# Patient Record
Sex: MALE | Race: ASIAN | NOT HISPANIC OR LATINO
[De-identification: names, ages, dates, MRNs, and addresses within clinical notes are randomized per-mention and may not be internally consistent; named-entity substitution may affect disease eponyms.]

---

## 2019-05-07 ENCOUNTER — TRANSCRIPTION ENCOUNTER (OUTPATIENT)
Age: 34
End: 2019-05-07

## 2019-09-09 ENCOUNTER — TRANSCRIPTION ENCOUNTER (OUTPATIENT)
Age: 34
End: 2019-09-09

## 2020-05-05 ENCOUNTER — TRANSCRIPTION ENCOUNTER (OUTPATIENT)
Age: 35
End: 2020-05-05

## 2020-06-25 ENCOUNTER — OUTPATIENT (OUTPATIENT)
Dept: OUTPATIENT SERVICES | Facility: HOSPITAL | Age: 35
LOS: 1 days | End: 2020-06-25

## 2020-06-25 LAB — SARS-COV-2 RNA SPEC QL NAA+PROBE: SIGNIFICANT CHANGE UP

## 2021-01-19 ENCOUNTER — APPOINTMENT (OUTPATIENT)
Dept: OTOLARYNGOLOGY | Facility: CLINIC | Age: 36
End: 2021-01-19
Payer: MEDICAID

## 2021-01-19 ENCOUNTER — NON-APPOINTMENT (OUTPATIENT)
Age: 36
End: 2021-01-19

## 2021-01-19 VITALS
DIASTOLIC BLOOD PRESSURE: 78 MMHG | WEIGHT: 175 LBS | SYSTOLIC BLOOD PRESSURE: 110 MMHG | HEIGHT: 72 IN | TEMPERATURE: 97.7 F | HEART RATE: 87 BPM | BODY MASS INDEX: 23.7 KG/M2

## 2021-01-19 DIAGNOSIS — H91.92 UNSPECIFIED HEARING LOSS, LEFT EAR: ICD-10-CM

## 2021-01-19 PROCEDURE — 31231 NASAL ENDOSCOPY DX: CPT

## 2021-01-19 PROCEDURE — 99072 ADDL SUPL MATRL&STAF TM PHE: CPT

## 2021-01-19 PROCEDURE — 92557 COMPREHENSIVE HEARING TEST: CPT

## 2021-01-19 PROCEDURE — 99203 OFFICE O/P NEW LOW 30 MIN: CPT | Mod: 25

## 2021-01-19 PROCEDURE — 92567 TYMPANOMETRY: CPT

## 2021-01-19 NOTE — ASSESSMENT
[FreeTextEntry1] : Patient is a 35 year old male who presents with chronic left sided nasal congestion. Nasal endoscopy significant for deviated septum to the left, no nasal polyps but there is some mucosal edema - likely allergic rhinitis. No evidence of eustachian tube dysfunction today but may have caught him on a good day. Doesn't tolerate flonase due to nose bleeds, advised of lateral spray technique.  \par \par Plan\par - audio today wnl \par - referral to allergist for possible immunotherapy \par - f/u in 6-8 weeks-- will repeat audio at that time\par - may benefit from eustachian tube dilation in the future but at this time is not a candidate due to normal audio

## 2021-01-19 NOTE — HISTORY OF PRESENT ILLNESS
[de-identified] : Patient is a 35 year old male who presents with chronic nasal congestion on the left side. Patient was previously followed by an ENT for a while, he reports the ENT did a diagnostic myringotomy in an attempt to equalize pressure which did not provide him any relief.   No tympanostomy tube was performed.  He reports a history of allergies and takes loratadine PRN which does relieve some of his symptoms. He has used Flonase in the past without relief, he last used it 6 months ago. He has a history of a deviated septum. \par cetirizine works better for him than loratadine.  Previously he was going to do immunotherapy, but was unable to commit to it.  Has used sinus rinses in the past with limited benefit.

## 2021-01-19 NOTE — END OF VISIT
[FreeTextEntry3] : I personally saw and examined REJI MAXWELL in detail.  I spoke to MARIELA Santiago regarding the assessment and plan of care. I performed the procedures and relevant physical exam.  I have reviewed the above assessment and plan of care and I agree.  I have made changes to the body of the note wherever necessary and appropriate.

## 2021-01-19 NOTE — REASON FOR VISIT
[Initial Evaluation] : an initial evaluation for [FreeTextEntry2] : nasal congestion and left sided ear fullness

## 2021-01-19 NOTE — REVIEW OF SYSTEMS
[Nasal Congestion] : nasal congestion [Nose Bleeds] : nose bleeds [Negative] : Heme/Lymph [As Noted in HPI] : as noted in HPI [Hearing Loss] : hearing loss

## 2021-01-19 NOTE — PHYSICAL EXAM
[Midline] : trachea located in midline position [Normal] : no rashes [Nasal Endoscopy Performed] : nasal endoscopy was performed, see procedure section for findings [] : septum deviated to the left [de-identified] : slightly stenotic canal left

## 2021-01-19 NOTE — PROCEDURE
[FreeTextEntry6] : reason for exam: anterior rhinoscopy insufficient for symptom evaluation \par \par Fiberoptic nasopharyngoscopy was performed.  R/b/a of procedure was explained to the patient and they agreed to proceed with procedure.  Nasal cavities were treated with afrin and lidocaine prior to nasal endoscopy to make the patient more comfortable.  left inferior turbinate was normal, left middle turbinate was  normal, left superior turbinate was normal no boggy or erythematous mucosa.  OMC patent on left, superior, inferior and middle meati clear no evidence of purulence or sinusitis.  left sphenoethmoidal recess clear.  No nasal polyps seen.  Septum DEVIATED TO THE LEFT NP clear no masses or lesions.  No obstruction of choanae, torus clear. \par \par scope #: 24\par \par

## 2021-03-07 ENCOUNTER — TRANSCRIPTION ENCOUNTER (OUTPATIENT)
Age: 36
End: 2021-03-07

## 2021-03-17 ENCOUNTER — APPOINTMENT (OUTPATIENT)
Dept: OTOLARYNGOLOGY | Facility: CLINIC | Age: 36
End: 2021-03-17

## 2021-11-03 ENCOUNTER — APPOINTMENT (OUTPATIENT)
Dept: OTOLARYNGOLOGY | Facility: CLINIC | Age: 36
End: 2021-11-03
Payer: MEDICAID

## 2021-11-03 VITALS
TEMPERATURE: 98 F | BODY MASS INDEX: 23.7 KG/M2 | DIASTOLIC BLOOD PRESSURE: 75 MMHG | HEART RATE: 74 BPM | SYSTOLIC BLOOD PRESSURE: 121 MMHG | HEIGHT: 72 IN | WEIGHT: 175 LBS

## 2021-11-03 DIAGNOSIS — R09.81 NASAL CONGESTION: ICD-10-CM

## 2021-11-03 DIAGNOSIS — J34.2 DEVIATED NASAL SEPTUM: ICD-10-CM

## 2021-11-03 DIAGNOSIS — H93.8X2 OTHER SPECIFIED DISORDERS OF LEFT EAR: ICD-10-CM

## 2021-11-03 PROCEDURE — 92557 COMPREHENSIVE HEARING TEST: CPT

## 2021-11-03 PROCEDURE — 31231 NASAL ENDOSCOPY DX: CPT

## 2021-11-03 PROCEDURE — 92567 TYMPANOMETRY: CPT

## 2021-11-03 PROCEDURE — 99214 OFFICE O/P EST MOD 30 MIN: CPT | Mod: 25

## 2021-11-03 RX ORDER — AZELASTINE HYDROCHLORIDE 137 UG/1
137 SPRAY, METERED NASAL
Qty: 1 | Refills: 3 | Status: ACTIVE | COMMUNITY
Start: 2021-11-03 | End: 1900-01-01

## 2021-11-03 RX ORDER — MONTELUKAST 10 MG/1
10 TABLET, FILM COATED ORAL DAILY
Qty: 30 | Refills: 5 | Status: ACTIVE | COMMUNITY
Start: 2021-11-03 | End: 1900-01-01

## 2021-11-03 NOTE — HISTORY OF PRESENT ILLNESS
[de-identified] : Patient is a 35 year old male who presents with chronic nasal congestion on the left side. Patient was previously followed by an ENT for a while, he reports the ENT did a diagnostic myringotomy in an attempt to equalize pressure which did not provide him any relief.   No tympanostomy tube was performed.  He reports a history of allergies and takes loratadine PRN which does relieve some of his symptoms. He has used Flonase in the past without relief, he last used it 6 months ago. He has a history of a deviated septum. Says he cannot breathe thru the L side.  At last visit he had a normal audio and then was put on flonase. \par  [FreeTextEntry1] : Pt takes loratidine. Says he no longer uses flonase because it dries his nose and causes epistaxis. Says his symptoms flare up with cold weather (fall/winter). Pt did not follow up with allergist because the allergy pills don't help so he felt the immunotherapy would not either

## 2021-11-03 NOTE — PROCEDURE
[Congested] : congested [Allergic] : allergic signs [Silas] : silas [FreeTextEntry6] : reason for exam: anterior rhinoscopy insufficient for symptom evaluation \par \par Fiberoptic nasal endoscopy was performed.  R/b/a of procedure was explained to the patient and they agreed to proceed with procedure.  Nasal cavities were treated with afrin and lidocaine prior to nasal endoscopy to make the patient more comfortable.  normal mucosa, normal b/l inferior, middle and superior turbinates, inferior, middle and superior meati and sphenoethmoidal recess.  No nasal polyps.  Septum deviated to the L\par \par scope #: 28\par \par

## 2021-11-03 NOTE — REASON FOR VISIT
[Subsequent Evaluation] : a subsequent evaluation for [Allergy] : allergy [FreeTextEntry2] : L ear fullness and congestion

## 2021-11-03 NOTE — REVIEW OF SYSTEMS
[Hearing Loss] : hearing loss [As Noted in HPI] : as noted in HPI [Nasal Congestion] : nasal congestion [Nose Bleeds] : nose bleeds [Negative] : Heme/Lymph

## 2021-11-03 NOTE — PHYSICAL EXAM
[Nasal Endoscopy Performed] : nasal endoscopy was performed, see procedure section for findings [] : septum deviated to the left [Midline] : trachea located in midline position [Normal] : no rashes [de-identified] : slightly stenotic canal left  [de-identified] : enlarged

## 2021-11-03 NOTE — ASSESSMENT
[FreeTextEntry1] : Patient is a 35 year old male who presents with chronic left sided nasal congestion. Nasal endoscopy significant for deviated septum to the left, no nasal polyps but there is some mucosal edema - likely allergic rhinitis. No evidence of eustachian tube dysfunction today or at last visit on audio. Doesn't tolerate flonase due to nose bleeds, advised of lateral spray technique. Pt w difficulty breathing thru L side of nose . Pt did not follow up with allergist\par \par Plan\par - - D/w patient r/b/a of septoplasty, bilateral inferior turbinate reduction \par - d./w patient possibility of intranasal splint for 1 week\par - postoperative instructions were discussed with the patient including no aerobic exercise for 2 weeks, no heavy lifting for 2 weeks\par - risks of bleeding and septal perforation were discussed \par - all questions were answered \par - dc flonase, add astelin and singulair \par - pt has definite signs of allergic rhinitis, will schedule appt with allergist for possible immunotherapy as surgery alone will likely not cure all symptoms

## 2021-11-11 ENCOUNTER — NON-APPOINTMENT (OUTPATIENT)
Age: 36
End: 2021-11-11

## 2022-07-05 ENCOUNTER — NON-APPOINTMENT (OUTPATIENT)
Age: 37
End: 2022-07-05

## 2022-07-08 ENCOUNTER — APPOINTMENT (OUTPATIENT)
Dept: ORTHOPEDIC SURGERY | Facility: CLINIC | Age: 37
End: 2022-07-08

## 2022-07-08 VITALS
SYSTOLIC BLOOD PRESSURE: 120 MMHG | HEIGHT: 72 IN | DIASTOLIC BLOOD PRESSURE: 81 MMHG | BODY MASS INDEX: 23.03 KG/M2 | WEIGHT: 170 LBS | HEART RATE: 89 BPM

## 2022-07-08 DIAGNOSIS — M54.9 DORSALGIA, UNSPECIFIED: ICD-10-CM

## 2022-07-08 PROCEDURE — 99204 OFFICE O/P NEW MOD 45 MIN: CPT

## 2022-07-08 PROCEDURE — 72110 X-RAY EXAM L-2 SPINE 4/>VWS: CPT

## 2022-07-08 RX ORDER — DICLOFENAC SODIUM 50 MG/1
50 TABLET, DELAYED RELEASE ORAL
Qty: 28 | Refills: 0 | Status: ACTIVE | COMMUNITY
Start: 2022-07-08 | End: 1900-01-01

## 2022-07-08 NOTE — PHYSICAL EXAM
[de-identified] : Lumbar Physical Exam\par \par Gait - Normal\par \par Station - Normal\par \par Sagittal balance - Normal\par \par Compensatory mechanism? - None\par \par Heel walk - Normal\par \par Toe walk - Normal\par \par Reflexes\par Patellar - normal\par Gastroc - normal\par Clonus - No\par \par Hip Exam - Normal\par \par Straight leg raise - none\par \par Pulses - 2+ dp/pt\par \par Range of motion -reduced\par \par Sensation \par Sensation intact to light touch in L1, L2, L3, L4, L5 and S1 dermatomes bilaterally\par \par Motor\par 	IP	Quad	HS	TA	Gastroc	EHL\par Right	5/5	5/5	5/5	5/5	5/5	5/5\par Left	5/5	5/5	5/5	5/5	5/5	5/5 [de-identified] : Lumbar radiographs\par Small change in disc height, mild disc degeneration\par No instability

## 2022-07-08 NOTE — HISTORY OF PRESENT ILLNESS
[de-identified] : This is a 36-year-old male with 1 week of low back pain.  He denies any radicular type pain.  He does state that the symptoms are worse with prolonged activity.  He denies any bowel bladder issues.  He denies any saddle anesthesia.  The patient has centered over his low back bilaterally.

## 2022-07-19 ENCOUNTER — OUTPATIENT (OUTPATIENT)
Dept: OUTPATIENT SERVICES | Facility: HOSPITAL | Age: 37
LOS: 1 days | End: 2022-07-19

## 2022-07-19 DIAGNOSIS — Z20.822 CONTACT WITH AND (SUSPECTED) EXPOSURE TO COVID-19: ICD-10-CM

## 2022-07-19 LAB — SARS-COV-2 RNA SPEC QL NAA+PROBE: DETECTED

## 2023-06-23 ENCOUNTER — APPOINTMENT (OUTPATIENT)
Dept: ORTHOPEDIC SURGERY | Facility: CLINIC | Age: 38
End: 2023-06-23
Payer: MEDICAID

## 2023-06-23 VITALS
BODY MASS INDEX: 23.03 KG/M2 | TEMPERATURE: 97.4 F | OXYGEN SATURATION: 98 % | HEART RATE: 69 BPM | WEIGHT: 170 LBS | SYSTOLIC BLOOD PRESSURE: 120 MMHG | HEIGHT: 72 IN | DIASTOLIC BLOOD PRESSURE: 82 MMHG

## 2023-06-23 DIAGNOSIS — M25.512 PAIN IN LEFT SHOULDER: ICD-10-CM

## 2023-06-23 PROCEDURE — 73030 X-RAY EXAM OF SHOULDER: CPT | Mod: LT

## 2023-06-23 PROCEDURE — 99214 OFFICE O/P EST MOD 30 MIN: CPT

## 2023-06-23 RX ORDER — NABUMETONE 750 MG/1
750 TABLET, FILM COATED ORAL
Qty: 60 | Refills: 0 | Status: ACTIVE | COMMUNITY
Start: 2023-06-23 | End: 1900-01-01

## 2023-06-23 NOTE — DISCUSSION/SUMMARY
[de-identified] : The patient has tendinitis of the left shoulder.  I have discussed the pathology, natural history and treatment options with him.  He is started on a course of nabumetone.  Medication risks have been reviewed.  He will be reevaluated in 3 weeks.

## 2023-06-23 NOTE — HISTORY OF PRESENT ILLNESS
[de-identified] : 37 year old RHD male  presents for initial evaluation of left shoulder pain x 3 months. Denies trauma or injury. He complains of constant tightness in the shoulder sharper and worse with lifting/carrying, ROM. His pain is over the AC joint. He has tried OTC NSAIDs and topical pain relievers with minimal relief. He reports an injury in the left arm 10 years ago, reports he had ecchymosis in the left arm but never sought treatment as it resolved on its own.

## 2023-06-23 NOTE — PHYSICAL EXAM
[Rad] : radial 2+ and symmetric bilaterally [Normal] : Alert and in no acute distress [Poor Appearance] : well-appearing [Acute Distress] : not in acute distress [Obese] : not obese [de-identified] : The patient has no respiratory distress. Mood and affect are normal. The patient is alert and oriented to person, place and time.\par Examination of the cervical spine demonstrates no tenderness, no deformity and no muscle spasm. Cervical spine rotation is 60° to the right, 60° to the left, 75° of extension and 45° of flexion. Neurologic exam of the upper extremities reveals intact sensation to light touch. Motor function is 5 over 5 in all groups. Deep tendon reflexes are 2+ and equal at the biceps, triceps and brachioradialis.\par Examination of the left shoulder demonstrates no deformity. The skin is intact. There is no erythema. There is tenderness anteriorly. Impingement sign is positive There is no instability. Drop arm test is negative. Empty can test is negative. Liftoff test is negative. [place] test is negative. [de-identified] : AP, transscapular and axillary x-rays of the left shoulder demonstrate no fracture, no dislocation and no bony abnormality.

## 2023-08-07 ENCOUNTER — NON-APPOINTMENT (OUTPATIENT)
Age: 38
End: 2023-08-07